# Patient Record
Sex: MALE | Race: WHITE | HISPANIC OR LATINO | URBAN - METROPOLITAN AREA
[De-identification: names, ages, dates, MRNs, and addresses within clinical notes are randomized per-mention and may not be internally consistent; named-entity substitution may affect disease eponyms.]

---

## 2023-11-05 ENCOUNTER — EMERGENCY (EMERGENCY)
Facility: HOSPITAL | Age: 48
LOS: 1 days | Discharge: HOME | End: 2023-11-05
Admitting: EMERGENCY MEDICINE
Payer: SELF-PAY

## 2023-11-05 VITALS
TEMPERATURE: 98 F | RESPIRATION RATE: 16 BRPM | HEART RATE: 75 BPM | OXYGEN SATURATION: 95 % | SYSTOLIC BLOOD PRESSURE: 136 MMHG | DIASTOLIC BLOOD PRESSURE: 84 MMHG

## 2023-11-05 PROCEDURE — 99283 EMERGENCY DEPT VISIT LOW MDM: CPT

## 2023-11-05 NOTE — ED PROVIDER NOTE - CLINICAL SUMMARY MEDICAL DECISION MAKING FREE TEXT BOX
47 y/o M no PMH [place]. [place], [employer] woke [place] up to ask if he needed help, said he was hungry so EMS called to bring pt here  pt c/o hunger, asking to sleep and eat  will provide food, allow pt to sleep a bit then dc.  has no somatic complaints.

## 2023-11-05 NOTE — ED PROVIDER NOTE - OBJECTIVE STATEMENT
49 y/o M no PMH p/w hunger pain. reports that he [place] then [employer] came up to pt to ask if he needed assistance at which point he said he wanted food and EMS arrived and brought pt here. pt is asking for a bed to sleep and for food. reports he did not eat much and he is hungry. denies abd pain, n/v/d, cp, sob, dahl, dizziness, ha, fever, chills or any somatic complaints

## 2023-11-05 NOTE — ED PROVIDER NOTE - CARE PLAN
Principal Discharge DX:	Hunger pain  Assessment and plan of treatment:	pt p[place]w hunger pain from train station. denies medical complaints  asking for bed to sleep and food, will allow pt to sleep and give po food>dc   1

## 2023-11-05 NOTE — ED ADULT NURSE NOTE - CHIEF COMPLAINT QUOTE
patient BIBA from [facility]; asked [facility] to call for an ambulance; using  patient admits to wanting to come to a warm place and that he is hungry; as per EMS patient was eating a sandwich when they arrived on scene

## 2023-11-05 NOTE — ED ADULT NURSE NOTE - NSFALLUNIVINTERV_ED_ALL_ED
Bed/Stretcher in lowest position, wheels locked, appropriate side rails in place/Call bell, personal items and telephone in reach/Instruct patient to call for assistance before getting out of bed/chair/stretcher/Non-slip footwear applied when patient is off stretcher/Landing to call system/Physically safe environment - no spills, clutter or unnecessary equipment/Purposeful proactive rounding/Room/bathroom lighting operational, light cord in reach

## 2023-11-05 NOTE — ED PROVIDER NOTE - PHYSICAL EXAMINATION
· CONSTITUTIONAL: Well appearing, well nourished, awake, alert, oriented to person, place, time/situation and in no apparent distress.  · HEAD: Head atraumatic, normal cephalic shape.  · CARDIAC: Normal rate, regular rhythm.  Heart sounds S1, S2.  No murmurs, rubs or gallops.  · RESPIRATORY: Breath sounds clear and equal bilaterally.  · GASTROINTESTINAL: Abdomen soft, non-tender, no guarding.  · MUSCULOSKELETAL: Spine appears normal, range of motion is not limited, no muscle or joint tenderness  · NEUROLOGICAL: Alert and oriented, no focal deficits, no motor or sensory deficits.  · SKIN: Skin normal color for race, warm, dry and intact. No evidence of rash.  · PSYCHIATRIC: Alert and oriented to person, place, time/situation. normal mood and affect. no apparent risk to self or others.

## 2023-11-05 NOTE — ED ADULT TRIAGE NOTE - CHIEF COMPLAINT QUOTE
patient BIBA from [facility]; asked [facility] to call for an ambulance; using  patient admits to wanting to come to a warm place and that he is hungry; as per EMS patient was eating a sandwich when they arrived on scene

## 2023-11-05 NOTE — ED PROVIDER NOTE - PATIENT PORTAL LINK FT
You can access the FollowMyHealth Patient Portal offered by [facility] by registering at the following website: [email or web address]. By joining "[employer]’s FollowMyHealth portal, you will also be able to view your health information using other applications (apps) compatible with our system.

## 2023-11-05 NOTE — ED PROVIDER NOTE - NSDCPRINTRESULTS_ED_ALL_ED
normal...
Patient requests all Lab, Cardiology, and Radiology Results on their Discharge Instructions

## 2023-11-08 DIAGNOSIS — T73.0XXA STARVATION, INITIAL ENCOUNTER: ICD-10-CM
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
